# Patient Record
Sex: MALE | Race: OTHER | Employment: UNEMPLOYED | URBAN - METROPOLITAN AREA
[De-identification: names, ages, dates, MRNs, and addresses within clinical notes are randomized per-mention and may not be internally consistent; named-entity substitution may affect disease eponyms.]

---

## 2018-10-30 ENCOUNTER — HOSPITAL ENCOUNTER (EMERGENCY)
Age: 33
Discharge: HOME | End: 2018-10-31
Attending: EMERGENCY MEDICINE
Payer: SELF-PAY

## 2018-10-30 VITALS
HEART RATE: 77 BPM | HEIGHT: 64 IN | OXYGEN SATURATION: 98 % | RESPIRATION RATE: 16 BRPM | SYSTOLIC BLOOD PRESSURE: 146 MMHG | DIASTOLIC BLOOD PRESSURE: 90 MMHG | TEMPERATURE: 98.2 F | WEIGHT: 145 LBS | BODY MASS INDEX: 24.75 KG/M2

## 2018-10-30 DIAGNOSIS — R30.0 DYSURIA: Primary | ICD-10-CM

## 2018-10-30 PROCEDURE — 99283 EMERGENCY DEPT VISIT LOW MDM: CPT | Performed by: EMERGENCY MEDICINE

## 2018-10-30 PROCEDURE — 74011000250 HC RX REV CODE- 250: Performed by: EMERGENCY MEDICINE

## 2018-10-30 PROCEDURE — 87491 CHLMYD TRACH DNA AMP PROBE: CPT

## 2018-10-30 PROCEDURE — 74011250637 HC RX REV CODE- 250/637: Performed by: EMERGENCY MEDICINE

## 2018-10-30 PROCEDURE — 81003 URINALYSIS AUTO W/O SCOPE: CPT | Performed by: EMERGENCY MEDICINE

## 2018-10-30 PROCEDURE — 96372 THER/PROPH/DIAG INJ SC/IM: CPT | Performed by: EMERGENCY MEDICINE

## 2018-10-30 PROCEDURE — 74011250636 HC RX REV CODE- 250/636: Performed by: EMERGENCY MEDICINE

## 2018-10-30 RX ORDER — SULFAMETHOXAZOLE AND TRIMETHOPRIM 800; 160 MG/1; MG/1
1 TABLET ORAL 2 TIMES DAILY
Qty: 14 TAB | Refills: 0 | Status: SHIPPED | OUTPATIENT
Start: 2018-10-30 | End: 2018-11-06

## 2018-10-30 RX ORDER — AZITHROMYCIN 250 MG/1
1000 TABLET, FILM COATED ORAL
Status: COMPLETED | OUTPATIENT
Start: 2018-10-30 | End: 2018-10-30

## 2018-10-30 RX ORDER — PHENAZOPYRIDINE HYDROCHLORIDE 200 MG/1
200 TABLET, FILM COATED ORAL 3 TIMES DAILY
Qty: 6 TAB | Refills: 0 | Status: SHIPPED | OUTPATIENT
Start: 2018-10-30 | End: 2018-11-01

## 2018-10-30 RX ADMIN — AZITHROMYCIN 1000 MG: 250 TABLET, FILM COATED ORAL at 23:58

## 2018-10-30 RX ADMIN — CEFTRIAXONE SODIUM 250 MG: 250 INJECTION, POWDER, FOR SOLUTION INTRAMUSCULAR; INTRAVENOUS at 23:58

## 2018-10-31 NOTE — ED PROVIDER NOTES
The history is provided by the patient and the spouse. The history is limited by a language barrier. A  was used. Urinary Pain This is a new problem. The current episode started more than 1 week ago. The problem occurs every urination. The problem has been gradually worsening. The quality of the pain is described as burning and aching. The pain is moderate. There has been no fever. He is sexually active. There is no history of pyelonephritis. Associated symptoms include frequency, urgency and flank pain. Pertinent negatives include no chills, no sweats, no nausea, no vomiting, no discharge, no vaginal discharge, no penile discharge, no abdominal pain and no back pain. The patient is not pregnant. He has tried nothing for the symptoms. His past medical history does not include kidney stones or recurrent UTIs. History reviewed. No pertinent past medical history. History reviewed. No pertinent surgical history. History reviewed. No pertinent family history. Social History Socioeconomic History  Marital status:  Spouse name: Not on file  Number of children: Not on file  Years of education: Not on file  Highest education level: Not on file Social Needs  Financial resource strain: Not on file  Food insecurity - worry: Not on file  Food insecurity - inability: Not on file  Transportation needs - medical: Not on file  Transportation needs - non-medical: Not on file Occupational History  Not on file Tobacco Use  Smoking status: Never Smoker  Smokeless tobacco: Never Used Substance and Sexual Activity  Alcohol use: No  
  Frequency: Never  Drug use: No  
 Sexual activity: Not on file Other Topics Concern  Not on file Social History Narrative  Not on file ALLERGIES: Pcn [penicillins] Review of Systems Constitutional: Negative for activity change, chills, diaphoresis and fever. HENT: Negative for dental problem, hearing loss, nosebleeds, rhinorrhea and sore throat. Eyes: Negative for pain, discharge, redness and visual disturbance. Respiratory: Negative for cough, chest tightness and shortness of breath. Cardiovascular: Negative for chest pain, palpitations and leg swelling. Gastrointestinal: Negative for abdominal pain, constipation, diarrhea, nausea and vomiting. Endocrine: Negative for cold intolerance, heat intolerance, polydipsia and polyuria. Genitourinary: Positive for flank pain, frequency and urgency. Negative for dysuria, penile discharge and vaginal discharge. Musculoskeletal: Negative for arthralgias, back pain, joint swelling, myalgias and neck pain. Skin: Negative for pallor and rash. Allergic/Immunologic: Negative for environmental allergies and food allergies. Neurological: Negative for dizziness, tremors, light-headedness, numbness and headaches. Hematological: Negative for adenopathy. Does not bruise/bleed easily. Psychiatric/Behavioral: Negative for confusion and dysphoric mood. The patient is not nervous/anxious and is not hyperactive. All other systems reviewed and are negative. Vitals:  
 10/30/18 2111 BP: 146/90 Pulse: 77 Resp: 16 Temp: 98.2 °F (36.8 °C) SpO2: 98% Weight: 65.8 kg (145 lb) Height: 5' 4\" (1.626 m) Physical Exam  
Constitutional: He is oriented to person, place, and time. He appears well-developed and well-nourished. He appears distressed. HENT:  
Head: Normocephalic and atraumatic. Right Ear: External ear normal.  
Left Ear: External ear normal.  
Mouth/Throat: Oropharynx is clear and moist. No oropharyngeal exudate. Eyes: Conjunctivae and EOM are normal. Pupils are equal, round, and reactive to light. No scleral icterus. Neck: Normal range of motion. Neck supple. No JVD present. No thyromegaly present.   
Cardiovascular: Normal rate, regular rhythm, normal heart sounds and intact distal pulses. Exam reveals no gallop and no friction rub. No murmur heard. Pulmonary/Chest: Effort normal and breath sounds normal. No respiratory distress. He has no wheezes. Abdominal: Soft. Bowel sounds are normal. He exhibits no distension and no mass. There is no hepatosplenomegaly. There is no tenderness. There is no rebound. Musculoskeletal: Normal range of motion. He exhibits no edema, tenderness or deformity. Neurological: He is alert and oriented to person, place, and time. No cranial nerve deficit or sensory deficit. He exhibits normal muscle tone. Coordination normal.  
Skin: Skin is warm and dry. Capillary refill takes less than 2 seconds. No rash noted. Psychiatric: He has a normal mood and affect. His behavior is normal. Judgment and thought content normal.  
Nursing note and vitals reviewed. MDM Number of Diagnoses or Management Options Dysuria: new and requires workup Diagnosis management comments: Differential diagnosis UTI, kidney stone, urethritis Initial urine dip is negative for infection or blood I will cover the patient for possible STD given his urethritis symptoms Urine GC chlamydia are pending Amount and/or Complexity of Data Reviewed Clinical lab tests: ordered and reviewed Tests in the medicine section of CPT®: ordered and reviewed Review and summarize past medical records: yes Risk of Complications, Morbidity, and/or Mortality Presenting problems: moderate Diagnostic procedures: low Management options: low General comments: Elements of this note have been dictated via voice recognition software. Text and phrases may be limited by the accuracy of the software. The chart has been reviewed, but errors may still be present. Patient Progress Patient progress: improved Procedures

## 2018-10-31 NOTE — ED NOTES
I have reviewed discharge instructions with the patient. The patient verbalized understanding. Patient left ED via Discharge Method: ambulatory to Home with (insert name of family/friend, self, transport family). Opportunity for questions and clarification provided. Patient given 2 scripts. To continue your aftercare when you leave the hospital, you may receive an automated call from our care team to check in on how you are doing. This is a free service and part of our promise to provide the best care and service to meet your aftercare needs.  If you have questions, or wish to unsubscribe from this service please call [phone]. Thank you for Choosing our [address] [place] [address] Emergency Department.

## 2018-10-31 NOTE — DISCHARGE INSTRUCTIONS
Dolor al Kristin Mann (disuria): Instrucciones de cuidado - [ Painful Urination (Dysuria): Care Instructions ]  Instrucciones de cuidado  Sentir ardor al orinar (disuria) es un síntoma común de jimmie infección de las vías urinarias u otros problemas urinarios. La vejiga puede inflamarse. Assaria puede causar dolor al llenarse o vaciarse la vejiga. Usted también puede sentir dolor si el conducto que transporta la orina desde la vejiga hacia afuera del organismo (uretra) se irrita o se infecta. Las infecciones de transmisión sexual (STI, por thomas siglas en inglés) también pueden causar dolor al orinar. A veces, el dolor puede ser causado por otras cosas además de jimmie infección. La uretra puede irritarse a causa de jabones, perfumes u objetos extraños en la uretra. Los cálculos renales pueden causar dolor cuando pasan por la uretra. Puede ser difícil encontrar la causa. Es posible que usted deba hacerse pruebas. El tratamiento para el dolor al orinar depende de la causa. La atención de seguimiento es jimmie parte clave de sánchez tratamiento y seguridad. Asegúrese de hacer y acudir a todas las citas, y llame a sánchez médico si está teniendo problemas. También es jimmie buena idea saber los resultados de thomas exámenes y mantener jimmie lista de los medicamentos que sonia. ¿Cómo puede cuidarse en el hogar? · [employer] agua brinda los siguientes shaye o [address] Assaria ayudará a que la orina sea menos concentrada. (Si tiene enfermedad de los riñones, el corazón o el hígado y tiene que [place]'s líquidos, hable con sánchez médico antes de aumentar la cantidad de líquidos que rama). · Evite las bebidas gaseosas o con cafeína. Pueden irritar la vejiga. · Orine con frecuencia. Trate de vaciar la vejiga cada vez que orine. Para las mujeres:  · Orine inmediatamente después de ellen tenido relaciones sexuales. · Después de ir al baño, límpiese de adelante hacia atrás.   · Evite el uso de duchas vaginales, los ramírez de burbujas y los [place] de higiene femenina. Y evite otros productos para la higiene femenina que contengan desodorantes. ¿Cuándo debe pedir ayuda? Llame a sánchez médico ahora mismo o busque atención médica inmediata si:    · Tiene síntomas nuevos shanti fiebre, náuseas o vómito.     · Tiene síntomas nuevos o peores de un problema urinario. Por ejemplo:  ? Tiene suzanne o pus en la orina. ? Tiene escalofríos o le duele el cuerpo. ? Siente más dolor al [place]. ? Tiene dolor en la justus o el abdomen. ? Tiene dolor de espalda homero debajo de la caja torácica (el flanco).    Vigile muy de cerca los cambios en sánchez aurea, y asegúrese de comunicarse con sánchez médico si tiene algún problema. ¿Dónde puede encontrar más información en inglés? Manuel Hernandez a [email or web address] Colette Vikashs [address] en la búsqueda para aprender más acerca de \"Dolor al [place] (disuria): Instrucciones de cuidado - [ Painful Urination (Dysuria): Care Instructions ]. \"  Revisado: 21 marzo, 2018  Versión del contenido: 11.8  © 7365-0519 [employer]. Las instrucciones de cuidado fueron adaptadas bajo licencia por [employer] (which disclaims liability or warranty for this information). Si usted tiene [place] afección médica o sobre estas instrucciones, siempre pregunte a sánchez profesional de aurea. [employer] niega toda garantía o responsabilidad por sánchez uso de esta información.

## 2018-11-02 LAB
C TRACH RRNA SPEC QL NAA+PROBE: NEGATIVE
N GONORRHOEA RRNA SPEC QL NAA+PROBE: NEGATIVE
SPECIMEN SOURCE: NORMAL